# Patient Record
Sex: MALE | Race: WHITE | Employment: FULL TIME | URBAN - METROPOLITAN AREA
[De-identification: names, ages, dates, MRNs, and addresses within clinical notes are randomized per-mention and may not be internally consistent; named-entity substitution may affect disease eponyms.]

---

## 2018-10-08 ENCOUNTER — APPOINTMENT (OUTPATIENT)
Dept: GENERAL RADIOLOGY | Age: 58
End: 2018-10-08
Attending: EMERGENCY MEDICINE
Payer: COMMERCIAL

## 2018-10-08 ENCOUNTER — HOSPITAL ENCOUNTER (OUTPATIENT)
Age: 58
Discharge: HOME | End: 2018-10-08
Payer: COMMERCIAL

## 2018-10-08 VITALS
HEART RATE: 84 BPM | DIASTOLIC BLOOD PRESSURE: 82 MMHG | WEIGHT: 190 LBS | SYSTOLIC BLOOD PRESSURE: 137 MMHG | BODY MASS INDEX: 30 KG/M2 | TEMPERATURE: 99 F | RESPIRATION RATE: 18 BRPM | OXYGEN SATURATION: 96 %

## 2018-10-08 DIAGNOSIS — M17.12 ARTHRITIS OF LEFT KNEE: Primary | ICD-10-CM

## 2018-10-08 PROCEDURE — 99203 OFFICE O/P NEW LOW 30 MIN: CPT

## 2018-10-08 PROCEDURE — 73560 X-RAY EXAM OF KNEE 1 OR 2: CPT | Performed by: EMERGENCY MEDICINE

## 2018-10-08 NOTE — ED PROVIDER NOTES
Patient Seen in: [address]    History   Patient presents with:  Knee Pain    Stated Complaint: knee pain     HPI    Patient is a 51-year-old male who presents for evaluation of intermittent left knee pain x1 week.   Kaz Saunders regular rhythm, normal heart sounds and intact distal pulses. Pulmonary/Chest: Effort normal and breath sounds normal.   Musculoskeletal: He exhibits tenderness. He exhibits no deformity.         Left knee: He exhibits decreased range of motion and effusi

## 2018-10-08 NOTE — ED INITIAL ASSESSMENT (HPI)
Reports left knee pain x 10 days. Denies injury/trauma. Denies hx of surgery. 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 States pain causing difficulty ambulating. Requesting x ray.

## 2018-10-30 ENCOUNTER — HOSPITAL ENCOUNTER (OUTPATIENT)
Dept: GENERAL RADIOLOGY | Facility: HOSPITAL | Age: 58
Discharge: HOME | End: 2018-10-30
Attending: ORTHOPAEDIC SURGERY
Payer: COMMERCIAL

## 2018-10-30 ENCOUNTER — TELEPHONE (OUTPATIENT)
Dept: ORTHOPEDICS CLINIC | Facility: CLINIC | Age: 58
End: 2018-10-30

## 2018-10-30 ENCOUNTER — OFFICE VISIT (OUTPATIENT)
Dept: ORTHOPEDICS CLINIC | Facility: CLINIC | Age: 58
End: 2018-10-30
Payer: COMMERCIAL

## 2018-10-30 DIAGNOSIS — M25.562 PAIN IN BOTH KNEES, UNSPECIFIED CHRONICITY: ICD-10-CM

## 2018-10-30 DIAGNOSIS — M25.561 PAIN IN BOTH KNEES, UNSPECIFIED CHRONICITY: ICD-10-CM

## 2018-10-30 DIAGNOSIS — M17.11 PRIMARY OSTEOARTHRITIS OF RIGHT KNEE: ICD-10-CM

## 2018-10-30 DIAGNOSIS — M25.562 ACUTE PAIN OF LEFT KNEE: Primary | ICD-10-CM

## 2018-10-30 PROCEDURE — 99212 OFFICE O/P EST SF 10 MIN: CPT | Performed by: ORTHOPAEDIC SURGERY

## 2018-10-30 PROCEDURE — 73564 X-RAY EXAM KNEE 4 OR MORE: CPT | Performed by: ORTHOPAEDIC SURGERY

## 2018-10-30 PROCEDURE — 99243 OFF/OP CNSLTJ NEW/EST LOW 30: CPT | Performed by: ORTHOPAEDIC SURGERY

## 2018-10-30 RX ORDER — MELOXICAM 15 MG/1
15 TABLET ORAL DAILY
Qty: 30 TABLET | Refills: 0 | Status: SHIPPED | OUTPATIENT
Start: 2018-10-30 | End: 2019-01-23 | Stop reason: ALTCHOICE

## 2018-10-30 NOTE — PROGRESS NOTES
10/30/2018  [facility]  9/20/1960  66year old   male  Indira Hernandez MD    HPI:   Patient presents with:  Knee Pain: bilateral knee - left - onset 20 days ago - no injury - was in [facility] in [place] and has [facility] x-rays in the system - has pain in t oriented x 3 and overall well appearing. The patient has normal mood. The patient is non-tender and atraumatic with the exception of their bilateral knees. The patient's skin is intact and compartments are soft.   The patient's lower extremities are war treatment plan.

## 2018-10-30 NOTE — TELEPHONE ENCOUNTER
Please call patient and let him know to take meloxicam sparingly as he does have a history of hypertension.

## 2019-01-23 ENCOUNTER — LAB ENCOUNTER (OUTPATIENT)
Dept: LAB | Age: 59
End: 2019-01-23
Attending: FAMILY MEDICINE
Payer: COMMERCIAL

## 2019-01-23 ENCOUNTER — TELEPHONE (OUTPATIENT)
Dept: FAMILY MEDICINE CLINIC | Facility: CLINIC | Age: 59
End: 2019-01-23

## 2019-01-23 ENCOUNTER — EXTERNAL RECORD (OUTPATIENT)
Dept: HEALTH INFORMATION MANAGEMENT | Facility: OTHER | Age: 59
End: 2019-01-23

## 2019-01-23 ENCOUNTER — OFFICE VISIT (OUTPATIENT)
Dept: FAMILY MEDICINE CLINIC | Facility: CLINIC | Age: 59
End: 2019-01-23
Payer: COMMERCIAL

## 2019-01-23 VITALS
TEMPERATURE: 97 F | BODY MASS INDEX: 33.34 KG/M2 | DIASTOLIC BLOOD PRESSURE: 92 MMHG | HEART RATE: 57 BPM | WEIGHT: 205 LBS | HEIGHT: 65.6 IN | SYSTOLIC BLOOD PRESSURE: 149 MMHG

## 2019-01-23 DIAGNOSIS — Z00.00 ANNUAL PHYSICAL EXAM: ICD-10-CM

## 2019-01-23 DIAGNOSIS — R73.9 HYPERGLYCEMIA: ICD-10-CM

## 2019-01-23 DIAGNOSIS — Z12.11 ENCOUNTER FOR SCREENING COLONOSCOPY: ICD-10-CM

## 2019-01-23 DIAGNOSIS — Z00.00 ANNUAL PHYSICAL EXAM: Primary | ICD-10-CM

## 2019-01-23 DIAGNOSIS — R73.9 HYPERGLYCEMIA: Primary | ICD-10-CM

## 2019-01-23 LAB
ALBUMIN SERPL BCP-MCNC: 4.3 G/DL (ref 3.5–4.8)
ALBUMIN/GLOB SERPL: 1.4 {RATIO} (ref 1–2)
ALP SERPL-CCNC: 50 U/L (ref 32–100)
ALT SERPL-CCNC: 27 U/L (ref 17–63)
ANION GAP SERPL CALC-SCNC: 10 MMOL/L (ref 0–18)
AST SERPL-CCNC: 22 U/L (ref 15–41)
BASOPHILS # BLD: 0 K/UL (ref 0–0.2)
BASOPHILS NFR BLD: 1 %
BILIRUB SERPL-MCNC: 0.9 MG/DL (ref 0.3–1.2)
BUN SERPL-MCNC: 12 MG/DL (ref 8–20)
BUN/CREAT SERPL: 14.1 (ref 10–20)
CALCIUM SERPL-MCNC: 9.5 MG/DL (ref 8.5–10.5)
CHLORIDE SERPL-SCNC: 102 MMOL/L (ref 95–110)
CHOLEST SERPL-MCNC: 151 MG/DL (ref 110–200)
CO2 SERPL-SCNC: 25 MMOL/L (ref 22–32)
CREAT SERPL-MCNC: 0.85 MG/DL (ref 0.5–1.5)
EOSINOPHIL # BLD: 0.1 K/UL (ref 0–0.7)
EOSINOPHIL NFR BLD: 1 %
ERYTHROCYTE [DISTWIDTH] IN BLOOD BY AUTOMATED COUNT: 13 % (ref 11–15)
EST. AVERAGE GLUCOSE BLD GHB EST-MCNC: 120 MG/DL (ref 68–126)
GLOBULIN PLAS-MCNC: 3 G/DL (ref 2.5–3.7)
GLUCOSE SERPL-MCNC: 116 MG/DL (ref 70–99)
HBA1C MFR BLD HPLC: 5.8 % (ref ?–5.7)
HCT VFR BLD AUTO: 47.8 % (ref 41–52)
HDLC SERPL-MCNC: 35 MG/DL
HGB BLD-MCNC: 16.3 G/DL (ref 13.5–17.5)
LDLC SERPL CALC-MCNC: 95 MG/DL (ref 0–99)
LYMPHOCYTES # BLD: 3.2 K/UL (ref 1–4)
LYMPHOCYTES NFR BLD: 34 %
MCH RBC QN AUTO: 32.3 PG (ref 27–32)
MCHC RBC AUTO-ENTMCNC: 34.1 G/DL (ref 32–37)
MCV RBC AUTO: 94.7 FL (ref 80–100)
MONOCYTES # BLD: 1 K/UL (ref 0–1)
MONOCYTES NFR BLD: 10 %
NEUTROPHILS # BLD AUTO: 5.2 K/UL (ref 1.8–7.7)
NEUTROPHILS NFR BLD: 54 %
NONHDLC SERPL-MCNC: 116 MG/DL
OSMOLALITY UR CALC.SUM OF ELEC: 285 MOSM/KG (ref 275–295)
PATIENT FASTING: YES
PLATELET # BLD AUTO: 218 K/UL (ref 140–400)
PMV BLD AUTO: 10.2 FL (ref 7.4–10.3)
POTASSIUM SERPL-SCNC: 4.2 MMOL/L (ref 3.3–5.1)
PROT SERPL-MCNC: 7.3 G/DL (ref 5.9–8.4)
RBC # BLD AUTO: 5.04 M/UL (ref 4.5–5.9)
SODIUM SERPL-SCNC: 137 MMOL/L (ref 136–144)
TRIGL SERPL-MCNC: 105 MG/DL (ref 1–149)
TSH SERPL-ACNC: 3.22 UIU/ML (ref 0.45–5.33)
WBC # BLD AUTO: 9.6 K/UL (ref 4–11)

## 2019-01-23 PROCEDURE — 36415 COLL VENOUS BLD VENIPUNCTURE: CPT

## 2019-01-23 PROCEDURE — 99386 PREV VISIT NEW AGE 40-64: CPT | Performed by: FAMILY MEDICINE

## 2019-01-23 PROCEDURE — 85025 COMPLETE CBC W/AUTO DIFF WBC: CPT

## 2019-01-23 PROCEDURE — 80061 LIPID PANEL: CPT

## 2019-01-23 PROCEDURE — 80053 COMPREHEN METABOLIC PANEL: CPT

## 2019-01-23 PROCEDURE — 83036 HEMOGLOBIN GLYCOSYLATED A1C: CPT

## 2019-01-23 PROCEDURE — 84443 ASSAY THYROID STIM HORMONE: CPT

## 2019-01-23 NOTE — PROGRESS NOTES
HPI:    Patient ID: Luca Matamoros is a 66year old male. Doing well. Here for annual physical. States that bp is good at home and refuses adjustment at this time.    Will see cardiologist in month for f/u        Review of Systems   Constitutional: Negativ Imaging & Referrals:  OP REFERRAL TO [facility] GI TELEPHONE COLON SCREEN       [id number]

## 2019-03-19 NOTE — H&P
[address] [facility] [address] Gastroenterology                                                                                                  Clinic History and Physical     Pa Social History: Social History    Tobacco Use      Smoking status: Former Smoker      Smokeless tobacco: Never Used      Tobacco comment: quit 2004    Alcohol use: No    Drug use: No       Medications (Active prior to today's visit):    Current Outpat reviewed      Labs/Imaging:     Patient's labs and imaging were reviewed and discussed with patient today. See HPI and A&P for further details.       ASSESSMENT/PLAN:   Renita Muñoz is a 66year old year-old male pt of Dr. Raymond Francisco with history of stent plac information/codes. All questions were answered to the patient’s satisfaction. The patient signed informed consent and elected to proceed with colonoscopy with intervention [i.e. polypectomy, stent placement, etc.] as indicated.       Orders This Visit:  No

## 2019-03-28 ENCOUNTER — OFFICE VISIT (OUTPATIENT)
Dept: GASTROENTEROLOGY | Facility: CLINIC | Age: 59
End: 2019-03-28
Payer: COMMERCIAL

## 2019-03-28 ENCOUNTER — TELEPHONE (OUTPATIENT)
Dept: GASTROENTEROLOGY | Facility: CLINIC | Age: 59
End: 2019-03-28

## 2019-03-28 VITALS
BODY MASS INDEX: 33.28 KG/M2 | SYSTOLIC BLOOD PRESSURE: 131 MMHG | HEIGHT: 65.6 IN | WEIGHT: 204.63 LBS | DIASTOLIC BLOOD PRESSURE: 77 MMHG | HEART RATE: 62 BPM

## 2019-03-28 DIAGNOSIS — Z12.11 SCREEN FOR COLON CANCER: Primary | ICD-10-CM

## 2019-03-28 DIAGNOSIS — Z12.11 SCREENING FOR COLON CANCER: Primary | ICD-10-CM

## 2019-03-28 PROCEDURE — 99243 OFF/OP CNSLTJ NEW/EST LOW 30: CPT | Performed by: NURSE PRACTITIONER

## 2019-03-28 PROCEDURE — 99212 OFFICE O/P EST SF 10 MIN: CPT | Performed by: NURSE PRACTITIONER

## 2019-03-28 NOTE — PATIENT INSTRUCTIONS
-Schedule colonoscopy w/ Dr. Alejandro Claros or Dr. Jane Durand with MAC   -Prep: Split dose Colyte or equivalent  -Anti-platelets and anti-coagulants: ASA-continue as prescribed  -Diabetes meds: None    ** If MAC @ [facility]/NE:    - HOLD ACE/ARBs the night before and/o

## 2019-03-28 NOTE — TELEPHONE ENCOUNTER
Scheduled for:  Colonoscopy 87275  Provider Name: Dr. Hale Outlaw  Date:  6/20/19  Location:  [facility]  Sedation:  MAC  Time:  9:00 am, arrival 8:00 am  Prep: Colyte  Meds/Allergies Reconciled?: Leydi/APN reviewed.   Diagnosis with codes:  Screening Z12.11  Was patient

## 2019-03-28 NOTE — TELEPHONE ENCOUNTER
GI RN's    Pt has Geovanni Lewis. Can you please check for PA? Thank you. Referral portion completed.

## 2019-03-29 NOTE — TELEPHONE ENCOUNTER
Spoke to [employer]. [facility] [place] ([phone]) and states no precertificaiton /prior authorization is needed under this plan for colonoscopy as long as pt has active coverage. Call reference number: [employer]. 3/29/19 @ 1005.

## 2019-06-20 ENCOUNTER — ANESTHESIA (OUTPATIENT)
Dept: ENDOSCOPY | Facility: HOSPITAL | Age: 59
End: 2019-06-20
Payer: COMMERCIAL

## 2019-06-20 ENCOUNTER — HOSPITAL ENCOUNTER (OUTPATIENT)
Facility: HOSPITAL | Age: 59
Setting detail: HOSPITAL OUTPATIENT SURGERY
Discharge: HOME | End: 2019-06-20
Attending: INTERNAL MEDICINE | Admitting: INTERNAL MEDICINE
Payer: COMMERCIAL

## 2019-06-20 ENCOUNTER — ANESTHESIA EVENT (OUTPATIENT)
Dept: ENDOSCOPY | Facility: HOSPITAL | Age: 59
End: 2019-06-20
Payer: COMMERCIAL

## 2019-06-20 VITALS
HEIGHT: 67 IN | SYSTOLIC BLOOD PRESSURE: 113 MMHG | BODY MASS INDEX: 30.13 KG/M2 | OXYGEN SATURATION: 97 % | DIASTOLIC BLOOD PRESSURE: 83 MMHG | WEIGHT: 192 LBS | HEART RATE: 61 BPM | RESPIRATION RATE: 15 BRPM

## 2019-06-20 DIAGNOSIS — Z12.11 SCREEN FOR COLON CANCER: ICD-10-CM

## 2019-06-20 PROCEDURE — 0DBN8ZX EXCISION OF SIGMOID COLON, VIA NATURAL OR ARTIFICIAL OPENING ENDOSCOPIC, DIAGNOSTIC: ICD-10-PCS | Performed by: INTERNAL MEDICINE

## 2019-06-20 PROCEDURE — 45380 COLONOSCOPY AND BIOPSY: CPT | Performed by: INTERNAL MEDICINE

## 2019-06-20 PROCEDURE — 45385 COLONOSCOPY W/LESION REMOVAL: CPT | Performed by: INTERNAL MEDICINE

## 2019-06-20 PROCEDURE — 0DBK8ZX EXCISION OF ASCENDING COLON, VIA NATURAL OR ARTIFICIAL OPENING ENDOSCOPIC, DIAGNOSTIC: ICD-10-PCS | Performed by: INTERNAL MEDICINE

## 2019-06-20 RX ORDER — SODIUM CHLORIDE, SODIUM LACTATE, POTASSIUM CHLORIDE, CALCIUM CHLORIDE 600; 310; 30; 20 MG/100ML; MG/100ML; MG/100ML; MG/100ML
INJECTION, SOLUTION INTRAVENOUS CONTINUOUS
Status: DISCONTINUED | OUTPATIENT
Start: 2019-06-20 | End: 2019-06-20

## 2019-06-20 RX ORDER — LIDOCAINE HYDROCHLORIDE 10 MG/ML
INJECTION, SOLUTION EPIDURAL; INFILTRATION; INTRACAUDAL; PERINEURAL AS NEEDED
Status: DISCONTINUED | OUTPATIENT
Start: 2019-06-20 | End: 2019-06-20 | Stop reason: SURG

## 2019-06-20 RX ORDER — GLYCOPYRROLATE 0.2 MG/ML
INJECTION INTRAMUSCULAR; INTRAVENOUS AS NEEDED
Status: DISCONTINUED | OUTPATIENT
Start: 2019-06-20 | End: 2019-06-20 | Stop reason: SURG

## 2019-06-20 RX ORDER — NALOXONE HYDROCHLORIDE 0.4 MG/ML
80 INJECTION, SOLUTION INTRAMUSCULAR; INTRAVENOUS; SUBCUTANEOUS AS NEEDED
Status: DISCONTINUED | OUTPATIENT
Start: 2019-06-20 | End: 2019-06-20

## 2019-06-20 RX ADMIN — SODIUM CHLORIDE, SODIUM LACTATE, POTASSIUM CHLORIDE, CALCIUM CHLORIDE: 600; 310; 30; 20 INJECTION, SOLUTION INTRAVENOUS at 09:24:00

## 2019-06-20 RX ADMIN — GLYCOPYRROLATE 0.2 MG: 0.2 INJECTION INTRAMUSCULAR; INTRAVENOUS at 09:00:00

## 2019-06-20 RX ADMIN — SODIUM CHLORIDE, SODIUM LACTATE, POTASSIUM CHLORIDE, CALCIUM CHLORIDE: 600; 310; 30; 20 INJECTION, SOLUTION INTRAVENOUS at 08:59:00

## 2019-06-20 RX ADMIN — LIDOCAINE HYDROCHLORIDE 50 MG: 10 INJECTION, SOLUTION EPIDURAL; INFILTRATION; INTRACAUDAL; PERINEURAL at 09:00:00

## 2019-06-20 NOTE — H&P
History & Physical Examination    Patient Name: Andres Santamaria  MRN: [id number]  CSN: [id number]  YOB: 1960    Diagnosis: colon cancer screening    Medications Prior to Admission:  PEG 3350-KCl-NaBcb-NaCl-NaSulf (COLYTE WITH FLAVOR PACKS) 240 g discussed the risks and benefits and alternatives with the patient/family. They understand and agree to proceed with plan of care. [ x ] I have reviewed the History and Physical done within the last 30 days. Any changes noted above. Yamil Payne.  Maryam Amarillo

## 2019-06-20 NOTE — OPERATIVE REPORT
[facility]P - [facility] Endoscopy Report      Preoperative Diagnosis:  - colon cancer screening      Postoperative Diagnosis:  - colon polyps x 2  - internal hemorrhoids      Procedure:    Colonoscopy       Surgeon:  Chiqui Iraheta M.D.     Anesthesia:

## 2019-06-20 NOTE — ANESTHESIA PREPROCEDURE EVALUATION
Anesthesia PreOp Note    HPI:     Marco Rodas is a 66year old male who presents for preoperative consultation requested by: Preston Thompson MD    Date of Surgery: 6/20/2019    Procedure(s):  COLONOSCOPY  Indication: Screen for colon cancer    Relevant Not on file    Tobacco Use      Smoking status: Former Smoker      Smokeless tobacco: Never Used      Tobacco comment: quit 2004    Substance and Sexual Activity      Alcohol use: No      Drug use: No      Sexual activity: Not on file    Lifestyle      y relevant, major complications, and any alternative forms of anesthetic management. All of the patient's questions were answered to the best of my ability. The patient desires the anesthetic management as planned.   Zaki Hawkins  6/20/2019 7:59 AM

## 2019-06-20 NOTE — ANESTHESIA POSTPROCEDURE EVALUATION
Patient: Edd Quest    Procedure Summary     Date:  06/20/19 Room / Location:  [address] [place] [address] ENDOSCOPY 04 / [address] [place] [address] ENDOSCOPY    Anesthesia Start:  7083 Anesthesia Stop:      Procedure:  COLONOSCOPY (N/A ) Diagnosis:       Screen for colon cancer      (Polyps, hemorrh

## 2019-06-21 ENCOUNTER — TELEPHONE (OUTPATIENT)
Dept: GASTROENTEROLOGY | Facility: CLINIC | Age: 59
End: 2019-06-21

## 2019-06-21 NOTE — TELEPHONE ENCOUNTER
Notes recorded by Uma Corbin MD on 6/20/2019 at 5:18 PM CDT  I wanted to get back to you with your colonoscopy results.  You had colon polyps removed which were benign.  I would advise a repeat colonoscopy in 5 years to make sure no new polyps are f

## 2019-06-21 NOTE — TELEPHONE ENCOUNTER
LMTCB. Please transfer to triage. 5  Year colonoscopy recall placed in system per Dr. Ene Steele   . Colonoscopy done on 06/20/19  . Next due on 06/20/24 . Snapshot updated.

## 2021-08-24 ENCOUNTER — HOSPITAL ENCOUNTER (OUTPATIENT)
Age: 61
Discharge: HOME | End: 2021-08-24
Payer: COMMERCIAL

## 2021-08-24 ENCOUNTER — APPOINTMENT (OUTPATIENT)
Dept: CT IMAGING | Age: 61
End: 2021-08-24
Attending: NURSE PRACTITIONER
Payer: COMMERCIAL

## 2021-08-24 ENCOUNTER — APPOINTMENT (OUTPATIENT)
Dept: GENERAL RADIOLOGY | Age: 61
End: 2021-08-24
Attending: NURSE PRACTITIONER
Payer: COMMERCIAL

## 2021-08-24 VITALS
RESPIRATION RATE: 18 BRPM | SYSTOLIC BLOOD PRESSURE: 158 MMHG | HEART RATE: 68 BPM | DIASTOLIC BLOOD PRESSURE: 84 MMHG | TEMPERATURE: 98 F | OXYGEN SATURATION: 98 %

## 2021-08-24 DIAGNOSIS — S42.294A OTHER CLOSED NONDISPLACED FRACTURE OF PROXIMAL END OF RIGHT HUMERUS, INITIAL ENCOUNTER: Primary | ICD-10-CM

## 2021-08-24 PROCEDURE — 73200 CT UPPER EXTREMITY W/O DYE: CPT | Performed by: NURSE PRACTITIONER

## 2021-08-24 PROCEDURE — 99213 OFFICE O/P EST LOW 20 MIN: CPT

## 2021-08-24 PROCEDURE — 99214 OFFICE O/P EST MOD 30 MIN: CPT

## 2021-08-24 PROCEDURE — 73030 X-RAY EXAM OF SHOULDER: CPT | Performed by: NURSE PRACTITIONER

## 2021-08-24 RX ORDER — CYCLOBENZAPRINE HCL 10 MG
10 TABLET ORAL 3 TIMES DAILY PRN
Qty: 20 TABLET | Refills: 0 | Status: SHIPPED | OUTPATIENT
Start: 2021-08-24 | End: 2021-08-31

## 2021-08-24 RX ORDER — HYDROCODONE BITARTRATE AND ACETAMINOPHEN 5; 325 MG/1; MG/1
1-2 TABLET ORAL EVERY 6 HOURS PRN
Qty: 20 TABLET | Refills: 0 | Status: SHIPPED | OUTPATIENT
Start: 2021-08-24 | End: 2021-08-31

## 2021-08-24 RX ORDER — IBUPROFEN 600 MG/1
600 TABLET ORAL EVERY 8 HOURS PRN
Qty: 30 TABLET | Refills: 0 | Status: SHIPPED | OUTPATIENT
Start: 2021-08-24 | End: 2021-08-31

## 2021-08-24 NOTE — ED PROVIDER NOTES
Patient Seen in: [facility]      History   Patient presents with:  Arm or Hand Injury: Patient fell off ladder injuring right shoulder. Unable to lift arm above head with moderate to severe pain.  - Entered by patient    Stated Complaint: Arm to*  Other systems are as noted in HPI. Constitutional and vital signs reviewed. All other systems reviewed and negative except as noted above. Physical Exam     ED Triage Vitals [08/24/21 1409]   /84   Pulse 68   Resp 18   Temp 98.2 °F (36. RIGHT (CPT=73200)   Final Result    PROCEDURE: CT HUMERUS RIGHT (CMU=96769)         COMPARISON: [facility], XR SHOULDER,     COMPLETE (MIN 2 VIEWS), RIGHT (CPT=73030), 8/24/2021, 2:13 PM.         INDICATIONS: Arm or Hand In fall     today. TECHNIQUE: 3 views were obtained. FINDINGS:     BONES: Poorly visualized nondisplaced fracture of the greater tuberosity     at the superior lateral aspect of the humeral head. No dislocation.       Follow-up CT scanning m understanding and all questions were answered. Patient advised to return or go to the ED for any new, persisting or worsening symptoms.     Medical Record Review/reassessment: I independently  reviewed available prior medical records for any recent pertinen

## 2021-08-24 NOTE — ED INITIAL ASSESSMENT (HPI)
Patient fell off a ladder this afternoon onto his right side. C/o right shoulder pain. + decreased rom.

## 2021-08-26 ENCOUNTER — OFFICE VISIT (OUTPATIENT)
Dept: ORTHOPEDICS CLINIC | Facility: CLINIC | Age: 61
End: 2021-08-26
Payer: COMMERCIAL

## 2021-08-26 VITALS — HEIGHT: 67 IN | BODY MASS INDEX: 30.61 KG/M2 | WEIGHT: 195 LBS

## 2021-08-26 DIAGNOSIS — S42.254A CLOSED NONDISPLACED FRACTURE OF GREATER TUBEROSITY OF RIGHT HUMERUS, INITIAL ENCOUNTER: Primary | ICD-10-CM

## 2021-08-26 PROCEDURE — 3008F BODY MASS INDEX DOCD: CPT | Performed by: ORTHOPAEDIC SURGERY

## 2021-08-26 PROCEDURE — 99214 OFFICE O/P EST MOD 30 MIN: CPT | Performed by: ORTHOPAEDIC SURGERY

## 2021-08-26 PROCEDURE — 23620 CLTX GR HMRL TBRS FX WO MNPJ: CPT | Performed by: ORTHOPAEDIC SURGERY

## 2021-08-26 RX ORDER — ERGOCALCIFEROL 1.25 MG/1
CAPSULE ORAL
COMMUNITY

## 2021-08-26 NOTE — PROGRESS NOTES
NURSING INTAKE COMMENTS: Patient presents with:  Urgent Care F/u: patient seen in the UC on 8/24/21 for a FX of the right humerus ,mild pain when still, pain is at a 5/10 with ROM       HPI: This 64year old male presents today with complaints of right william Occupational History      Not on file    Tobacco Use      Smoking status: Former Smoker      Smokeless tobacco: Never Used      Tobacco comment: quit 2004    Vaping Use      Vaping Use: Never used    Substance and Sexual Activity      Alcohol use:  No extension, biceps, triceps, and shoulder abduction strength 5 out of 5. Tamika sign negative. No obvious atrophy of the hand.         Imaging:   XR SHOULDER, COMPLETE (MIN 2 VIEWS), RIGHT (CPT=73030)    Result Date: 8/24/2021  PROCEDURE: XR SHOULDER, COMP Use of iterative reconstruction technique for dose reduction was used. Dose information is transmitted to the [facility]) NRDR ([address]) which includes the Dose Index Registry.   FINDINGS:  BONES: There is a

## 2021-09-09 ENCOUNTER — HOSPITAL ENCOUNTER (OUTPATIENT)
Dept: GENERAL RADIOLOGY | Facility: HOSPITAL | Age: 61
Discharge: HOME | End: 2021-09-09
Attending: ORTHOPAEDIC SURGERY
Payer: COMMERCIAL

## 2021-09-09 ENCOUNTER — OFFICE VISIT (OUTPATIENT)
Dept: ORTHOPEDICS CLINIC | Facility: CLINIC | Age: 61
End: 2021-09-09
Payer: COMMERCIAL

## 2021-09-09 DIAGNOSIS — S42.254A CLOSED NONDISPLACED FRACTURE OF GREATER TUBEROSITY OF RIGHT HUMERUS, INITIAL ENCOUNTER: ICD-10-CM

## 2021-09-09 DIAGNOSIS — Z47.89 ORTHOPEDIC AFTERCARE: ICD-10-CM

## 2021-09-09 DIAGNOSIS — Z47.89 ORTHOPEDIC AFTERCARE: Primary | ICD-10-CM

## 2021-09-09 PROCEDURE — 99024 POSTOP FOLLOW-UP VISIT: CPT | Performed by: ORTHOPAEDIC SURGERY

## 2021-09-09 PROCEDURE — 73030 X-RAY EXAM OF SHOULDER: CPT | Performed by: ORTHOPAEDIC SURGERY

## 2021-09-09 NOTE — PROGRESS NOTES
NURSING INTAKE COMMENTS: Patient presents with: Follow - Up: R shoulder injury, denies any pain       HPI: This 64year old male presents today with complaints of right shoulder follow-up. He is 2 weeks post injury from a greater tuberosity fracture.   He with exertion, palpitations, leg swelling  GI: denies abdominal pain, nausea, vomiting, diarrhea, constipation, hematochezia, worsening heartburn or stomach ulcers  : denies dysuria, hematuria, incontinence, increased frequency, urgency, difficulty urina soft tissue swelling. EFFUSION: None visible. OTHER: Negative. CONCLUSION:  1. Poorly visualized nondisplaced appearing fracture of the greater tuberosity of the humeral head as discussed above.   Follow-up CT scanning would help to further assess i 3:56 PM     Finalized by (CST): Felecia Elmore MD on 8/24/2021 at 3:59 PM             Labs:  Lab Results   Component Value Date    WBC 9.6 01/23/2019    HGB 16.3 01/23/2019     01/23/2019      Lab Results   Component Value Date     (H) 01/23/20

## 2021-09-24 ENCOUNTER — TELEPHONE (OUTPATIENT)
Dept: ORTHOPEDICS CLINIC | Facility: CLINIC | Age: 61
End: 2021-09-24

## 2021-09-24 NOTE — TELEPHONE ENCOUNTER
Spoke to pt and he states that he injured the tip of his left thumb in a crush injury on Wednesday. Pt went to [facility] in [place] and had XR on Wednesday. States he was given 5 stiches and abx. Pt given report but no disc with images.  [facility] did g

## 2021-09-24 NOTE — TELEPHONE ENCOUNTER
Per pt would to be seen today, states has sustained new injury to thumb and was advised to be seen.  Please advise

## 2021-10-15 ENCOUNTER — TELEPHONE (OUTPATIENT)
Dept: ORTHOPEDICS CLINIC | Facility: CLINIC | Age: 61
End: 2021-10-15

## 2021-10-28 NOTE — TELEPHONE ENCOUNTER
Dr. Cherelle Jaramillo,    I have attached 2 forms requiring your signature. Please sign off on form:  -Highlight the patient and hit \"Chart\" button. -In Chart Review, w/in the Encounter tab - click 1 time on the Telephone call encounter for 10/15/21Scroll down the telephone encounter.  -Click \"scan on\" blue Hyperlink under \"Media\" heading for 2 Claim Dr. Cherelle Jaramillo 10/28/21w/in the telephone enc.  -Click on Acknowledge button at the bottom right corner and left-click onto image, signature stamp appears and drag signature to Provider signature line. Stamp will turn blue. Close window.      Thank you,    Shayna Epps

## 2021-10-29 NOTE — TELEPHONE ENCOUNTER
LM that forms were completed and ready for  at the Ortho check in at [facility].  Copy sent through [facility].

## 2022-01-01 ENCOUNTER — EXTERNAL RECORD (OUTPATIENT)
Dept: OTHER | Age: 62
End: 2022-01-01

## 2022-10-04 ENCOUNTER — TELEPHONE (OUTPATIENT)
Dept: FAMILY MEDICINE CLINIC | Facility: CLINIC | Age: 62
End: 2022-10-04

## 2022-10-04 RX ORDER — METOPROLOL TARTRATE 50 MG/1
50 TABLET, FILM COATED ORAL DAILY
COMMUNITY
End: 2022-10-05 | Stop reason: DRUGHIGH

## 2022-10-04 RX ORDER — ATORVASTATIN CALCIUM 80 MG/1
40 TABLET, FILM COATED ORAL DAILY
COMMUNITY
End: 2022-12-27 | Stop reason: SDUPTHER

## 2022-10-04 NOTE — TELEPHONE ENCOUNTER
Ravinder Sena from [facility] requesting for last office visit notes and recent labs from PCP for Pt. Pt has an appointment with Cardiology tomorrow and they need to start a chart for Pt. Chart reviewed with Karen, and informed her Pt has not been seen since 2019, however, does have a appointment on 10/25/22. Ravinder Sena state she sent a fax request and called office on 9/23/22 initially.  Informed Karen, no encounters were noted regarding request.    Ravinder Sena asked to fax labs and progress notes from 2019 to [phone]    Labs and progress notes faxed per request.

## 2022-10-05 ENCOUNTER — LAB SERVICES (OUTPATIENT)
Dept: LAB | Age: 62
End: 2022-10-05

## 2022-10-05 ENCOUNTER — OFFICE VISIT (OUTPATIENT)
Dept: CARDIOLOGY | Age: 62
End: 2022-10-05

## 2022-10-05 VITALS
DIASTOLIC BLOOD PRESSURE: 68 MMHG | SYSTOLIC BLOOD PRESSURE: 124 MMHG | HEIGHT: 67 IN | HEART RATE: 60 BPM | WEIGHT: 180 LBS | BODY MASS INDEX: 28.25 KG/M2

## 2022-10-05 DIAGNOSIS — I25.10 CORONARY ARTERY DISEASE INVOLVING NATIVE CORONARY ARTERY OF NATIVE HEART WITHOUT ANGINA PECTORIS: Primary | ICD-10-CM

## 2022-10-05 DIAGNOSIS — E78.2 MIXED HYPERLIPIDEMIA: ICD-10-CM

## 2022-10-05 DIAGNOSIS — I10 PRIMARY HYPERTENSION: ICD-10-CM

## 2022-10-05 DIAGNOSIS — I25.10 CORONARY ARTERY DISEASE INVOLVING NATIVE CORONARY ARTERY OF NATIVE HEART WITHOUT ANGINA PECTORIS: ICD-10-CM

## 2022-10-05 LAB
BASOPHILS # BLD: 0.1 K/MCL (ref 0–0.3)
BASOPHILS NFR BLD: 1 %
DEPRECATED RDW RBC: 46.5 FL (ref 39–50)
EOSINOPHIL # BLD: 0.1 K/MCL (ref 0–0.5)
EOSINOPHIL NFR BLD: 1 %
ERYTHROCYTE [DISTWIDTH] IN BLOOD: 13.2 % (ref 11–15)
HBA1C MFR BLD: 5.5 % (ref 4.5–5.6)
HCT VFR BLD CALC: 45.8 % (ref 39–51)
HGB BLD-MCNC: 15.5 G/DL (ref 13–17)
IMM GRANULOCYTES # BLD AUTO: 0 K/MCL (ref 0–0.2)
IMM GRANULOCYTES # BLD: 0 %
LYMPHOCYTES # BLD: 3.3 K/MCL (ref 1–4)
LYMPHOCYTES NFR BLD: 43 %
MCH RBC QN AUTO: 32.5 PG (ref 26–34)
MCHC RBC AUTO-ENTMCNC: 33.8 G/DL (ref 32–36.5)
MCV RBC AUTO: 96 FL (ref 78–100)
MONOCYTES # BLD: 0.6 K/MCL (ref 0.3–0.9)
MONOCYTES NFR BLD: 8 %
NEUTROPHILS # BLD: 3.6 K/MCL (ref 1.8–7.7)
NEUTROPHILS NFR BLD: 47 %
NRBC BLD MANUAL-RTO: 0 /100 WBC
PLATELET # BLD AUTO: 229 K/MCL (ref 140–450)
RBC # BLD: 4.77 MIL/MCL (ref 4.5–5.9)
WBC # BLD: 7.7 K/MCL (ref 4.2–11)

## 2022-10-05 PROCEDURE — 80050 GENERAL HEALTH PANEL: CPT | Performed by: INTERNAL MEDICINE

## 2022-10-05 PROCEDURE — 80061 LIPID PANEL: CPT | Performed by: INTERNAL MEDICINE

## 2022-10-05 PROCEDURE — 93000 ELECTROCARDIOGRAM COMPLETE: CPT | Performed by: INTERNAL MEDICINE

## 2022-10-05 PROCEDURE — 3078F DIAST BP <80 MM HG: CPT | Performed by: INTERNAL MEDICINE

## 2022-10-05 PROCEDURE — 3074F SYST BP LT 130 MM HG: CPT | Performed by: INTERNAL MEDICINE

## 2022-10-05 PROCEDURE — 83036 HEMOGLOBIN GLYCOSYLATED A1C: CPT | Performed by: INTERNAL MEDICINE

## 2022-10-05 PROCEDURE — 99205 OFFICE O/P NEW HI 60 MIN: CPT | Performed by: INTERNAL MEDICINE

## 2022-10-05 PROCEDURE — 36415 COLL VENOUS BLD VENIPUNCTURE: CPT | Performed by: INTERNAL MEDICINE

## 2022-10-05 RX ORDER — METOPROLOL SUCCINATE 25 MG/1
25 TABLET, EXTENDED RELEASE ORAL DAILY
Qty: 30 TABLET | Refills: 3 | Status: SHIPPED | OUTPATIENT
Start: 2022-10-05 | End: 2022-12-27 | Stop reason: SDUPTHER

## 2022-10-06 ENCOUNTER — MED REC SCAN ONLY (OUTPATIENT)
Dept: FAMILY MEDICINE CLINIC | Facility: CLINIC | Age: 62
End: 2022-10-06

## 2022-10-06 LAB
ALBUMIN SERPL-MCNC: 4.2 G/DL (ref 3.6–5.1)
ALBUMIN/GLOB SERPL: 1.3 {RATIO} (ref 1–2.4)
ALP SERPL-CCNC: 63 UNITS/L (ref 45–117)
ALT SERPL-CCNC: 26 UNITS/L
ANION GAP SERPL CALC-SCNC: 12 MMOL/L (ref 7–19)
AST SERPL-CCNC: 23 UNITS/L
BILIRUB SERPL-MCNC: 0.8 MG/DL (ref 0.2–1)
BUN SERPL-MCNC: 11 MG/DL (ref 6–20)
BUN/CREAT SERPL: 15 (ref 7–25)
CALCIUM SERPL-MCNC: 9.1 MG/DL (ref 8.4–10.2)
CHLORIDE SERPL-SCNC: 107 MMOL/L (ref 97–110)
CHOLEST SERPL-MCNC: 117 MG/DL
CHOLEST/HDLC SERPL: 2.8 {RATIO}
CO2 SERPL-SCNC: 26 MMOL/L (ref 21–32)
CREAT SERPL-MCNC: 0.72 MG/DL (ref 0.67–1.17)
FASTING DURATION TIME PATIENT: ABNORMAL H
FASTING DURATION TIME PATIENT: NORMAL H
GFR SERPLBLD BASED ON 1.73 SQ M-ARVRAT: >90 ML/MIN
GLOBULIN SER-MCNC: 3.2 G/DL (ref 2–4)
GLUCOSE SERPL-MCNC: 100 MG/DL (ref 70–99)
HDLC SERPL-MCNC: 42 MG/DL
LDLC SERPL CALC-MCNC: 65 MG/DL
NONHDLC SERPL-MCNC: 75 MG/DL
POTASSIUM SERPL-SCNC: 5.1 MMOL/L (ref 3.4–5.1)
PROT SERPL-MCNC: 7.4 G/DL (ref 6.4–8.2)
SODIUM SERPL-SCNC: 140 MMOL/L (ref 135–145)
TRIGL SERPL-MCNC: 49 MG/DL
TSH SERPL-ACNC: 2.52 MCUNITS/ML (ref 0.35–5)

## 2022-10-07 ENCOUNTER — TELEPHONE (OUTPATIENT)
Dept: FAMILY MEDICINE CLINIC | Facility: CLINIC | Age: 62
End: 2022-10-07

## 2022-10-07 NOTE — TELEPHONE ENCOUNTER
Burt Hawkins MD    I have not seen this patient since 2019   He needs to be seen if he wants to keep me as a primary     Contact Name Contact Address Communication Relationship to Patient   Caio Rabago Unknown [phone] [facility]) Son, Emergency Contact

## 2022-10-25 ENCOUNTER — OFFICE VISIT (OUTPATIENT)
Dept: FAMILY MEDICINE CLINIC | Facility: CLINIC | Age: 62
End: 2022-10-25
Payer: COMMERCIAL

## 2022-10-25 VITALS
HEIGHT: 67 IN | SYSTOLIC BLOOD PRESSURE: 150 MMHG | WEIGHT: 179.63 LBS | TEMPERATURE: 98 F | DIASTOLIC BLOOD PRESSURE: 89 MMHG | HEART RATE: 55 BPM | BODY MASS INDEX: 28.19 KG/M2

## 2022-10-25 DIAGNOSIS — Z86.79 HISTORY OF HEART DISEASE: ICD-10-CM

## 2022-10-25 DIAGNOSIS — Z00.00 ANNUAL PHYSICAL EXAM: Primary | ICD-10-CM

## 2022-10-25 DIAGNOSIS — I10 ESSENTIAL HYPERTENSION: ICD-10-CM

## 2022-10-25 PROCEDURE — 99396 PREV VISIT EST AGE 40-64: CPT | Performed by: FAMILY MEDICINE

## 2022-10-25 PROCEDURE — 3077F SYST BP >= 140 MM HG: CPT | Performed by: FAMILY MEDICINE

## 2022-10-25 PROCEDURE — 3008F BODY MASS INDEX DOCD: CPT | Performed by: FAMILY MEDICINE

## 2022-10-25 PROCEDURE — 3079F DIAST BP 80-89 MM HG: CPT | Performed by: FAMILY MEDICINE

## 2022-10-25 RX ORDER — ATORVASTATIN CALCIUM 40 MG/1
40 TABLET, FILM COATED ORAL NIGHTLY
Qty: 90 TABLET | Refills: 1 | Status: SHIPPED | OUTPATIENT
Start: 2022-10-25

## 2022-10-25 RX ORDER — METOPROLOL SUCCINATE 25 MG/1
TABLET, EXTENDED RELEASE ORAL
COMMUNITY

## 2022-12-27 RX ORDER — METOPROLOL SUCCINATE 25 MG/1
25 TABLET, EXTENDED RELEASE ORAL DAILY
Qty: 90 TABLET | Refills: 3 | Status: SHIPPED | OUTPATIENT
Start: 2022-12-27

## 2022-12-27 RX ORDER — ATORVASTATIN CALCIUM 80 MG/1
40 TABLET, FILM COATED ORAL DAILY
Qty: 90 TABLET | Refills: 3 | Status: SHIPPED | OUTPATIENT
Start: 2022-12-27

## 2023-01-05 ENCOUNTER — ANCILLARY PROCEDURE (OUTPATIENT)
Dept: CARDIOLOGY | Age: 63
End: 2023-01-05
Attending: INTERNAL MEDICINE

## 2023-01-05 ENCOUNTER — MED REC SCAN ONLY (OUTPATIENT)
Dept: FAMILY MEDICINE CLINIC | Facility: CLINIC | Age: 63
End: 2023-01-05

## 2023-01-05 VITALS
BODY MASS INDEX: 28.25 KG/M2 | TEMPERATURE: 96.4 F | HEIGHT: 67 IN | SYSTOLIC BLOOD PRESSURE: 122 MMHG | DIASTOLIC BLOOD PRESSURE: 70 MMHG | WEIGHT: 180 LBS

## 2023-01-05 DIAGNOSIS — I25.10 CORONARY ARTERY DISEASE INVOLVING NATIVE CORONARY ARTERY OF NATIVE HEART WITHOUT ANGINA PECTORIS: ICD-10-CM

## 2023-01-05 LAB
AORTIC VALVE AREA (AVA): 0.59
AORTIC VALVE AREA: 2.29
ASCENDING AORTA (AAD): 3
AV MEAN GRADIENT (AVMG): 4.43
AV MEAN VELOCITY (AVMV): 0.97
AV PEAK GRADIENT (AVPG): 9.81
AV PEAK VELOCITY (AVPV): 1.57
AV STENOSIS SEVERITY TEXT: NORMAL
AVI LVOT PEAK GRADIENT (LVOTMG): 0.9
LEFT INTERNAL DIMENSION IN SYSTOLE (LVSD): 0.9
LEFT VENTRICULAR INTERNAL DIMENSION IN DIASTOLE (LVDD): 3.4
LEFT VENTRICULAR POSTERIOR WALL IN END DIASTOLE (LVPW): 4.7
LV EF: NORMAL %
LVOT 2D (LVOTD): 23.72
LVOT VTI (LVOTVTI): 0.98
MV PEAK A VELOCITY (MVPAV): 178
MV PEAK E VELOCITY (MVPEV): 0.61
RV END SYSTOLIC LONGITUDINAL STRAIN FREE WALL (RVGS): 2.05
TRICUSPID VALVE ANNULAR PEAK VELOCITY (TVAPV): 21

## 2023-01-05 PROCEDURE — 93306 TTE W/DOPPLER COMPLETE: CPT | Performed by: INTERNAL MEDICINE

## 2023-10-24 ENCOUNTER — TELEPHONE (OUTPATIENT)
Dept: FAMILY MEDICINE CLINIC | Facility: CLINIC | Age: 63
End: 2023-10-24

## 2024-01-04 ENCOUNTER — TELEPHONE (OUTPATIENT)
Dept: CARDIOLOGY | Age: 64
End: 2024-01-04

## 2024-01-12 RX ORDER — METOPROLOL SUCCINATE 25 MG/1
25 TABLET, EXTENDED RELEASE ORAL DAILY
Qty: 90 TABLET | Refills: 3 | Status: SHIPPED | OUTPATIENT
Start: 2024-01-12 | End: 2025-01-11

## 2024-02-22 ENCOUNTER — LAB SERVICES (OUTPATIENT)
Dept: CARDIOLOGY | Age: 64
End: 2024-02-22

## 2024-02-22 ENCOUNTER — APPOINTMENT (OUTPATIENT)
Dept: CARDIOLOGY | Age: 64
End: 2024-02-22

## 2024-02-22 VITALS
WEIGHT: 204.59 LBS | SYSTOLIC BLOOD PRESSURE: 144 MMHG | HEIGHT: 67 IN | OXYGEN SATURATION: 97 % | DIASTOLIC BLOOD PRESSURE: 82 MMHG | HEART RATE: 67 BPM | BODY MASS INDEX: 32.11 KG/M2 | TEMPERATURE: 96.4 F

## 2024-02-22 DIAGNOSIS — I25.10 CORONARY ARTERY DISEASE INVOLVING NATIVE CORONARY ARTERY OF NATIVE HEART WITHOUT ANGINA PECTORIS: ICD-10-CM

## 2024-02-22 DIAGNOSIS — E78.5 HYPERLIPIDEMIA, UNSPECIFIED HYPERLIPIDEMIA TYPE: Primary | ICD-10-CM

## 2024-02-22 DIAGNOSIS — I10 HYPERTENSION, UNSPECIFIED TYPE: Primary | ICD-10-CM

## 2024-02-22 DIAGNOSIS — I10 PRIMARY HYPERTENSION: ICD-10-CM

## 2024-02-22 DIAGNOSIS — E78.5 HYPERLIPIDEMIA, UNSPECIFIED HYPERLIPIDEMIA TYPE: ICD-10-CM

## 2024-02-22 DIAGNOSIS — R00.1 BRADYCARDIA, UNSPECIFIED: ICD-10-CM

## 2024-02-22 PROCEDURE — G2211 COMPLEX E/M VISIT ADD ON: HCPCS | Performed by: INTERNAL MEDICINE

## 2024-02-22 PROCEDURE — 99214 OFFICE O/P EST MOD 30 MIN: CPT | Performed by: INTERNAL MEDICINE

## 2024-02-22 PROCEDURE — 3079F DIAST BP 80-89 MM HG: CPT | Performed by: INTERNAL MEDICINE

## 2024-02-22 PROCEDURE — 3077F SYST BP >= 140 MM HG: CPT | Performed by: INTERNAL MEDICINE

## 2024-02-22 PROCEDURE — 36415 COLL VENOUS BLD VENIPUNCTURE: CPT | Performed by: INTERNAL MEDICINE

## 2024-02-23 ENCOUNTER — MED REC SCAN ONLY (OUTPATIENT)
Dept: FAMILY MEDICINE CLINIC | Facility: CLINIC | Age: 64
End: 2024-02-23

## 2024-02-23 LAB
CHOLEST SERPL-MCNC: 149 MG/DL
CHOLEST/HDLC SERPL: 3.2 {RATIO}
HDLC SERPL-MCNC: 46 MG/DL
LDLC SERPL CALC-MCNC: 85 MG/DL
NONHDLC SERPL-MCNC: 103 MG/DL
TRIGL SERPL-MCNC: 89 MG/DL

## 2024-02-24 ENCOUNTER — TELEPHONE (OUTPATIENT)
Dept: CARDIOLOGY | Age: 64
End: 2024-02-24

## 2024-02-24 RX ORDER — ATORVASTATIN CALCIUM 80 MG/1
80 TABLET, FILM COATED ORAL DAILY
Qty: 90 TABLET | Refills: 3 | Status: SHIPPED | COMMUNITY
Start: 2024-02-24

## 2024-04-22 ENCOUNTER — TELEPHONE (OUTPATIENT)
Facility: CLINIC | Age: 64
End: 2024-04-22

## 2024-04-22 NOTE — TELEPHONE ENCOUNTER
----- Message from Alvina Motley RN sent at 2019  2:21 PM CDT -----  Regardin year colonoscopy recall  5  Year colonoscopy recall placed in system per Dr. Ko   . Colonoscopy done on 19  . Next due on 24 . Snapshot updated.

## 2024-06-10 RX ORDER — ATORVASTATIN CALCIUM 80 MG/1
40 TABLET, FILM COATED ORAL DAILY
Qty: 90 TABLET | Refills: 0 | Status: SHIPPED | OUTPATIENT
Start: 2024-06-10

## 2024-11-08 ENCOUNTER — TELEPHONE (OUTPATIENT)
Dept: CARDIOLOGY | Age: 64
End: 2024-11-08

## 2024-12-09 RX ORDER — ATORVASTATIN CALCIUM 40 MG/1
40 TABLET, FILM COATED ORAL DAILY
Qty: 90 TABLET | Refills: 3 | Status: SHIPPED | OUTPATIENT
Start: 2024-12-09 | End: 2025-12-09

## 2024-12-30 RX ORDER — METOPROLOL SUCCINATE 25 MG/1
25 TABLET, EXTENDED RELEASE ORAL DAILY
Qty: 90 TABLET | Refills: 3 | Status: SHIPPED | OUTPATIENT
Start: 2024-12-30 | End: 2025-12-30

## 2025-01-23 ENCOUNTER — APPOINTMENT (OUTPATIENT)
Dept: CARDIOLOGY | Age: 65
End: 2025-01-23

## 2025-01-23 VITALS
SYSTOLIC BLOOD PRESSURE: 145 MMHG | BODY MASS INDEX: 30.97 KG/M2 | OXYGEN SATURATION: 98 % | DIASTOLIC BLOOD PRESSURE: 80 MMHG | WEIGHT: 197.75 LBS | HEART RATE: 54 BPM

## 2025-01-23 DIAGNOSIS — E78.5 HYPERLIPIDEMIA, UNSPECIFIED HYPERLIPIDEMIA TYPE: ICD-10-CM

## 2025-01-23 DIAGNOSIS — Z09 CARDIOLOGY FOLLOW-UP ENCOUNTER: Primary | ICD-10-CM

## (undated) DEVICE — Device: Brand: DEFENDO AIR/WATER/SUCTION AND BIOPSY VALVE

## (undated) DEVICE — Device: Brand: CUSTOM PROCEDURE KIT

## (undated) DEVICE — TRAP 4 CPTR CHMBR N EZ INLN

## (undated) DEVICE — SNARE CAPTIFLEX MICRO-OVL OLY

## (undated) DEVICE — LINE MNTR ADLT SET O2 INTMD

## (undated) DEVICE — 35 ML SYRINGE REGULAR TIP: Brand: MONOJECT

## (undated) DEVICE — MEDI-VAC NON-CONDUCTIVE SUCTION TUBING 6MM X 1.8M (6FT.) L: Brand: CARDINAL HEALTH

## (undated) DEVICE — FORCEP RADIAL JAW 4

## (undated) NOTE — LETTER
4/22/2024    [facility]        [address]        [place] [facility] [address]            Dear Pool Ramirez,      Our records indicate that you are due for an appointment for a Colonoscopy with Martinez Ko MD. Our doctors are booking out about 3-6 months in advance for procedures.     Please call our office to schedule a phone screening appointment to plan for the procedure(s).   Your medical well-being is important to us.    If your insurance requires a referral, please call your primary care office to request one.      Thank you,      The Physicians and Staff at [facility]

## (undated) NOTE — LETTER
Date & Time: 8/24/2021, 4:10 PM  Patient: Marco Rodas  Encounter Provider(s):    ESTEFANÍA Szymanski       To Whom It May Concern:    Marco Rodas was seen and treated in our department on 8/24/2021.  He should not return to work until cleared by [employer]